# Patient Record
Sex: MALE | Race: BLACK OR AFRICAN AMERICAN | NOT HISPANIC OR LATINO | Employment: UNEMPLOYED | URBAN - METROPOLITAN AREA
[De-identification: names, ages, dates, MRNs, and addresses within clinical notes are randomized per-mention and may not be internally consistent; named-entity substitution may affect disease eponyms.]

---

## 2020-06-02 ENCOUNTER — NURSE TRIAGE (OUTPATIENT)
Dept: ADMINISTRATIVE | Facility: CLINIC | Age: 65
End: 2020-06-02

## 2020-06-03 NOTE — TELEPHONE ENCOUNTER
Pt calling, states last night his HR was 55 with bp 170/99. He is calling to make sure that his HR isn't a concern. I had pt take BP while on phone, reports 170/95 with a HR of 81, reports he has not taken his nifedipine and just got back from walking a mile or two. Advised patient per protocol. Patient verbalized understanding.      Reason for Disposition   Systolic BP  >= 160 OR Diastolic >= 100    Additional Information   Negative: Difficult to awaken or acting confused (e.g., disoriented, slurred speech)   Negative: Severe difficulty breathing (e.g., struggling for each breath, speaks in single words)   Negative: [1] Weakness of the face, arm or leg on one side of the body AND [2] new onset   Negative: [1] Numbness (i.e., loss of sensation) of the face, arm or leg on one side of the body AND [2] new onset   Negative: [1] Chest pain lasts > 5 minutes AND [2] history of heart disease  (i.e., heart attack, bypass surgery, angina, angioplasty, CHF)   Negative: [1] Chest pain AND [2] took nitrogylcerin AND [3] pain was not relieved   Negative: Sounds like a life-threatening emergency to the triager   Negative: Symptom is main concern  (e.g., headache, chest pain)   Negative: Low blood pressure is main concern   Negative: [1] Systolic BP  >= 160 OR Diastolic >= 100 AND [2] cardiac or neurologic symptoms (e.g., chest pain, difficulty breathing, unsteady gait, blurred vision)   Negative: [1] Systolic BP  >= 200 OR Diastolic >= 120  AND [2] having NO cardiac or neurologic symptoms   Negative: [1] Postpartum < 6 weeks AND [2] BP Systolic BP  >= 140 OR Diastolic >= 90   Negative: [1] Systolic BP  >= 180 OR Diastolic >= 110 AND [2] missed most recent dose of blood pressure medication   Negative: Systolic BP  >= 180 OR Diastolic >= 110   Negative: Ran out of BP medications    Protocols used: HIGH BLOOD PRESSURE-A-